# Patient Record
Sex: MALE | HISPANIC OR LATINO | Employment: PART TIME | URBAN - METROPOLITAN AREA
[De-identification: names, ages, dates, MRNs, and addresses within clinical notes are randomized per-mention and may not be internally consistent; named-entity substitution may affect disease eponyms.]

---

## 2022-04-26 ENCOUNTER — OFFICE VISIT (OUTPATIENT)
Dept: SURGERY | Facility: CLINIC | Age: 27
End: 2022-04-26
Attending: INTERNAL MEDICINE

## 2022-04-26 ENCOUNTER — OFFICE VISIT (OUTPATIENT)
Dept: FAMILY MEDICINE | Facility: CLINIC | Age: 27
End: 2022-04-26
Payer: MEDICAID

## 2022-04-26 VITALS
RESPIRATION RATE: 16 BRPM | DIASTOLIC BLOOD PRESSURE: 76 MMHG | HEART RATE: 94 BPM | HEIGHT: 67 IN | BODY MASS INDEX: 21.5 KG/M2 | OXYGEN SATURATION: 100 % | TEMPERATURE: 97.7 F | WEIGHT: 137 LBS | SYSTOLIC BLOOD PRESSURE: 114 MMHG

## 2022-04-26 VITALS — SYSTOLIC BLOOD PRESSURE: 106 MMHG | DIASTOLIC BLOOD PRESSURE: 80 MMHG

## 2022-04-26 DIAGNOSIS — Z79.899 ON PRE-EXPOSURE PROPHYLAXIS FOR HIV: ICD-10-CM

## 2022-04-26 DIAGNOSIS — Z23 NEED FOR VACCINATION: ICD-10-CM

## 2022-04-26 DIAGNOSIS — L05.01 PILONIDAL ABSCESS: Primary | ICD-10-CM

## 2022-04-26 LAB
ALBUMIN SERPL-MCNC: 4.3 G/DL (ref 3.4–5)
ALP SERPL-CCNC: 75 U/L (ref 40–150)
ALT SERPL W P-5'-P-CCNC: 30 U/L (ref 0–70)
ANION GAP SERPL CALCULATED.3IONS-SCNC: 4 MMOL/L (ref 3–14)
AST SERPL W P-5'-P-CCNC: 21 U/L (ref 0–45)
BILIRUB SERPL-MCNC: 0.4 MG/DL (ref 0.2–1.3)
BUN SERPL-MCNC: 9 MG/DL (ref 7–30)
CALCIUM SERPL-MCNC: 9.1 MG/DL (ref 8.5–10.1)
CHLORIDE BLD-SCNC: 105 MMOL/L (ref 94–109)
CO2 SERPL-SCNC: 29 MMOL/L (ref 20–32)
CREAT SERPL-MCNC: 0.76 MG/DL (ref 0.66–1.25)
ERYTHROCYTE [DISTWIDTH] IN BLOOD BY AUTOMATED COUNT: 12.3 % (ref 10–15)
GFR SERPL CREATININE-BSD FRML MDRD: >90 ML/MIN/1.73M2
GLUCOSE BLD-MCNC: 86 MG/DL (ref 70–99)
HCT VFR BLD AUTO: 45.2 % (ref 40–53)
HGB BLD-MCNC: 15.4 G/DL (ref 13.3–17.7)
MCH RBC QN AUTO: 32.8 PG (ref 26.5–33)
MCHC RBC AUTO-ENTMCNC: 34.1 G/DL (ref 31.5–36.5)
MCV RBC AUTO: 96 FL (ref 78–100)
PLATELET # BLD AUTO: 204 10E3/UL (ref 150–450)
POTASSIUM BLD-SCNC: 4.2 MMOL/L (ref 3.4–5.3)
PROT SERPL-MCNC: 8.2 G/DL (ref 6.8–8.8)
RBC # BLD AUTO: 4.7 10E6/UL (ref 4.4–5.9)
SODIUM SERPL-SCNC: 138 MMOL/L (ref 133–144)
WBC # BLD AUTO: 11.5 10E3/UL (ref 4–11)

## 2022-04-26 PROCEDURE — 99203 OFFICE O/P NEW LOW 30 MIN: CPT | Mod: 25 | Performed by: SURGERY

## 2022-04-26 PROCEDURE — 87070 CULTURE OTHR SPECIMN AEROBIC: CPT | Performed by: SURGERY

## 2022-04-26 PROCEDURE — 36415 COLL VENOUS BLD VENIPUNCTURE: CPT | Performed by: INTERNAL MEDICINE

## 2022-04-26 PROCEDURE — 85027 COMPLETE CBC AUTOMATED: CPT | Performed by: INTERNAL MEDICINE

## 2022-04-26 PROCEDURE — 90715 TDAP VACCINE 7 YRS/> IM: CPT | Performed by: INTERNAL MEDICINE

## 2022-04-26 PROCEDURE — 87077 CULTURE AEROBIC IDENTIFY: CPT | Performed by: SURGERY

## 2022-04-26 PROCEDURE — 90471 IMMUNIZATION ADMIN: CPT | Performed by: INTERNAL MEDICINE

## 2022-04-26 PROCEDURE — 99204 OFFICE O/P NEW MOD 45 MIN: CPT | Mod: 25 | Performed by: INTERNAL MEDICINE

## 2022-04-26 PROCEDURE — 80053 COMPREHEN METABOLIC PANEL: CPT | Performed by: INTERNAL MEDICINE

## 2022-04-26 PROCEDURE — 10080 I&D PILONIDAL CYST SIMPLE: CPT | Performed by: SURGERY

## 2022-04-26 PROCEDURE — 87076 CULTURE ANAEROBE IDENT EACH: CPT | Performed by: SURGERY

## 2022-04-26 RX ORDER — EMTRICITABINE AND TENOFOVIR DISOPROXIL FUMARATE 200; 300 MG/1; MG/1
1 TABLET, FILM COATED ORAL
COMMUNITY
Start: 2022-02-08 | End: 2022-06-23

## 2022-04-26 RX ORDER — IBUPROFEN 800 MG/1
800 TABLET, FILM COATED ORAL EVERY 8 HOURS PRN
Qty: 30 TABLET | Refills: 0 | Status: SHIPPED | OUTPATIENT
Start: 2022-04-26 | End: 2022-06-23

## 2022-04-26 RX ORDER — DOCUSATE SODIUM 100 MG/1
100 CAPSULE, LIQUID FILLED ORAL 2 TIMES DAILY
Qty: 30 CAPSULE | Refills: 0 | Status: SHIPPED | OUTPATIENT
Start: 2022-04-26 | End: 2022-06-23

## 2022-04-26 RX ORDER — SULFAMETHOXAZOLE/TRIMETHOPRIM 800-160 MG
1 TABLET ORAL 2 TIMES DAILY
Qty: 20 TABLET | Refills: 0 | Status: SHIPPED | OUTPATIENT
Start: 2022-04-26 | End: 2022-06-23

## 2022-04-26 RX ORDER — HYDROCODONE BITARTRATE AND ACETAMINOPHEN 5; 325 MG/1; MG/1
1 TABLET ORAL EVERY 6 HOURS PRN
Qty: 18 TABLET | Refills: 0 | Status: SHIPPED | OUTPATIENT
Start: 2022-04-26 | End: 2022-04-29

## 2022-04-26 ASSESSMENT — PAIN SCALES - GENERAL: PAINLEVEL: SEVERE PAIN (7)

## 2022-04-26 NOTE — PATIENT INSTRUCTIONS
As discussed on the risks and complications of this condition Pilonidal abscess and possibly need to be done in a theatre by Surgeon if there is a possible communication with Spinal canal. As per our shared decision placing Urgent referral to general surgery - And since you are unable to dedicate today for this procedure defered the Acute diagnostic services offered to you.     Will do baseline labs and emphasize on keeping up your appointment with the Urgent referral to Surgeon placed to avoid complications as antibiotic is not the cure for this.     ============================

## 2022-04-26 NOTE — PROGRESS NOTES
Assessment and Plan  1. Pilonidal abscess  New problem, with recurrences since 11/2021 and this is the second episode since last one month. Per physical exam this appears deeper to deep fascia and the pus pocket not superficial and amenable to I&D at this time. Will give symptomatic treatment. Please see instructions in AVS below for details on the plan as per the shared decision with patient who declined ADS services offered for immediate removal. All the risks and complications discussed. Placed STAT general surgery referral as opted by the patient.   - Adult General Surg Referral; Future  - Comprehensive metabolic panel (BMP + Alb, Alk Phos, ALT, AST, Total. Bili, TP); Future  - CBC with platelets; Future  - sulfamethoxazole-trimethoprim (BACTRIM DS) 800-160 MG tablet; Take 1 tablet by mouth 2 times daily  Dispense: 20 tablet; Refill: 0  - ibuprofen (ADVIL/MOTRIN) 800 MG tablet; Take 1 tablet (800 mg) by mouth every 8 hours as needed for moderate pain  Dispense: 30 tablet; Refill: 0    2. On pre-exposure prophylaxis for HIV  Pt does follow outside facility for Pre exposure prophylaxis.     3. Need for vaccination  - TDAP VACCINE (Adacel, Boostrix)  [[id number]]     Patient Instructions   As discussed on the risks and complications of this condition Pilonidal abscess and possibly need to be done in a theatre by Surgeon if there is a possible communication with Spinal canal. As per our shared decision placing Urgent referral to general surgery - And since you are unable to dedicate today for this procedure defered the Acute diagnostic services offered to you.     Will do baseline labs and emphasize on keeping up your appointment with the Urgent referral to Surgeon placed to avoid complications as antibiotic is not the cure for this.     ============================        Return in about 3 weeks (around 5/17/2022), or if symptoms worsen or fail to improve, for Preventative Visit.    Christelle Banegas MD   HEALTH  [facility] 26 [facility] who presents for the following health issues       History of Present Illness       Reason for visit:  Pilonidal cyst  Symptom onset:  More than a month  Symptoms include:  Giant cyst at the top of my gluteal cleft  Symptom intensity:  Severe  Symptom progression:  Worsening  Had these symptoms before:  Yes  Has tried/received treatment for these symptoms:  No  What makes it worse:  Sitting and moving  What makes it better:  Baths and warm compress    He eats 2-3 servings of fruits and vegetables daily.He consumes 2 sweetened beverage(s) daily.He exercises with enough effort to increase his heart rate 60 or more minutes per day.  He exercises with enough effort to increase his heart rate 3 or less days per week. He is missing 1 dose(s) of medications per week.       Pt is new to  , no records in [facility] or care everywhere. Here for acute visit and going to establish care with  [place] in 20 days where he resides there.        No Known Allergies     History reviewed. No pertinent past medical history.    History reviewed. No pertinent surgical history.    History reviewed. No pertinent family history.    Social History     Tobacco Use     Smoking status: Current Some Day Smoker     Smokeless tobacco: Never Used   Substance Use Topics     Alcohol use: Yes     Comment: occasional        Current Outpatient Medications   Medication     emtricitabine-tenofovir (TRUVADA) 200-300 MG per tablet     ibuprofen (ADVIL/MOTRIN) 800 MG tablet     sulfamethoxazole-trimethoprim (BACTRIM DS) 800-160 MG tablet     No current facility-administered medications for this visit.        Review of Systems   Constitutional, HEENT, cardiovascular, pulmonary, GI, , musculoskeletal, neuro, skin, endocrine and psych systems are negative, except as otherwise noted.      Objective    /76   Pulse 94   Temp 97.7  F (36.5  C) (Temporal)   Resp 16   Ht 1.702 m (5' 7\")   " Wt 62.1 kg (137 lb)   SpO2 100%   BMI 21.46 kg/m    Body mass index is 21.46 kg/m .  Physical Exam   GENERAL: healthy, alert and no distress  NECK: no adenopathy, no asymmetry, masses, or scars and thyroid normal to palpation  RESP: lungs clear to auscultation - no rales, rhonchi or wheezes  CV: regular rate and rhythm, normal S1 S2, no S3 or S4, no murmur, click or rub, no peripheral edema and peripheral pulses strong  ABDOMEN: soft, nontender, no hepatosplenomegaly, no masses and bowel sounds normal  MS: no gross musculoskeletal defects noted, no edema  BACK EXAM : POSITIVE for pilonidal abscess measuring 5-6 cm in diameter in midline of the marisela cleft with is tender to palpate and no pus pocket visible yet. Positive for Mild erythema and swelling

## 2022-04-26 NOTE — PROGRESS NOTES
General Surgery H&P  Jong Zepeda MRN# [id number]   Age/Sex: 26 year old male YOB: 1995     Reason for visit: 1. Pilonidal abscess            Referring physician: Dr. Christelle Banegas                   Assessment and Plan:   Assessment:  1.  Recurrent pilonidal abscess -  s/p I&D 4/26/22    Plan:  - s/p I/D in clinic today (see procedure note)  - pain control with [place]  - Colace  - follow-up in two weeks over the phone  - anticipate further surgery in 6-8 weeks  -Patient is to continue the Bactrim for now.  Once the cultures have finalized, we will readjust the antibiotics as needed.  -Educated the patient on wound care.       Physician Attestation     I, Garry Gan, saw and evaluated Jong Zepeda as part of a shared resident visit.     I personally reviewed the vital signs, medications, labs and imaging.    I personally performed the substantive portion of the medical decision making for this visit - please see the resident documentation for full details.      Key management decisions made by me and carried out under my direction:     - I n D today of pilonidal abscess  - continue abx  - plan for wound recheck via phone.   - awaiting final cultures.     I personally performed the substantive portion of the history for this visit - please see the resident documentation for full details.    Key additional history findings made by me:     Hx of pilonidal abscess that resolve after spontaneous ruptures.  Patient has recurrent pilonidal abscesses.    I personally performed the substantive portion of the physical exam - please see the residents documentation for full details.    General: No acute distress, calm and cooperative, alert and oriented  Cardiac: Regular rate and rhythm  Respiratory: Breath sounds bilaterally, no wheezing  Abdominal: Soft, nontender, nondistended  Musculoskeletal: All extremities no gross deformity.  Moving all extremities and good range of motion.  Skin: No  "skin lesions or masses.  Large pilonidal abscess with fluctuance and mild induration.  Pilonidal abscess measured 4 x 3 cm.  Tender to palpation        Garry Gan  Date of Service (when I saw the patient): 04/26/22       Chief Complaint:     Chief Complaint   Patient presents with     Consult     Recurring issue - red and inflamed but not currently draining        History is obtained from the patient    HPI:   Jong Zepeda is a 26 year old male with no significant past medical history who presents with a pilonidal abscess.     Patient states he developed a painful area above his buttocks in 11/2021 with subsequent drainage on its own. He has had at least two recurrences since then. Today, he states it has \"filled up\" and has not drained yet. It is very painful. He endorses some mild chills in the past few days. He was seen by Dr. Banegas, who placed a STAT general surgery referral for incision and drainage.           Past Medical History:   History reviewed. No pertinent past medical history.           Past Surgical History:   History reviewed. No pertinent surgical history.          Social History:    reports that he has been smoking. He has never used smokeless tobacco. He reports current alcohol use. He reports that he does not use drugs.           Family History:   History reviewed. No pertinent family history.           Allergies:   No Known Allergies           Medications:     Prior to Admission medications    Medication Sig Start Date End Date Taking? Authorizing Provider   docusate sodium (COLACE) 100 MG capsule Take 1 capsule (100 mg) by mouth 2 times daily 4/26/22  Yes Garry Gan, DO   emtricitabine-tenofovir (TRUVADA) 200-300 MG per tablet Take 1 tablet by mouth 2/8/22 5/9/22 Yes Reported, Patient   HYDROcodone-acetaminophen (NORCO) 5-325 MG tablet Take 1 tablet by mouth every 6 hours as needed for pain 4/26/22 4/29/22 Yes Garry Gan, DO   ibuprofen (ADVIL/MOTRIN) 800 MG tablet Take 1 tablet " (800 mg) by mouth every 8 hours as needed for moderate pain 4/26/22  Yes Christelle Banegas MD   sulfamethoxazole-trimethoprim (BACTRIM DS) 800-160 MG tablet Take 1 tablet by mouth 2 times daily 4/26/22  Yes Christelle Banegas MD              Review of Systems:   A 12 point Review of Systems is negative other than noted in the HPI            Physical Exam:     Patient Vitals for the past 24 hrs:   BP   04/26/22 1359 106/80        [unfilled]   Constitutional:   awake, alert, cooperative, no apparent distress, and appears stated age       Eyes:   PERRL, conjunctiva/corneas clear, EOM's intact; no scleral edema or icterus noted        ENT:   Normocephalic, without obvious abnormality, atraumatic, Lips, mucosa, and tongue normal        Hematologic / Lymphatic:   No lymphadenopathy       Lungs:   Normal respiratory effort, no accessory muscle use, breath sounds bilaterally on auscultation       Cardiovascular:   Regular rate and rhythm       Abdomen:   Soft, nondistended, nontender to palpation       Musculoskeletal:   No obvious swelling, bruising or deformity       Skin:   Large, 4 x 3 cm pilonidal cyst with fluctuance             Data:            Results for orders placed or performed in visit on 04/26/22 (from the past 24 hour(s))   Comprehensive metabolic panel (BMP + Alb, Alk Phos, ALT, AST, Total. Bili, TP)   Result Value Ref Range    Sodium 138 133 - 144 mmol/L    Potassium 4.2 3.4 - 5.3 mmol/L    Chloride 105 94 - 109 mmol/L    Carbon Dioxide (CO2)      Anion Gap      Urea Nitrogen      Creatinine      Calcium      Glucose      Alkaline Phosphatase      AST      ALT      Protein Total      Albumin      Bilirubin Total      GFR Estimate     CBC with platelets   Result Value Ref Range    WBC Count 11.5 (H) 4.0 - 11.0 10e3/uL    RBC Count 4.70 4.40 - 5.90 10e6/uL    Hemoglobin 15.4 13.3 - 17.7 g/dL    Hematocrit 45.2 40.0 - 53.0 %    MCV 96 78 - 100 fL    MCH 32.8 26.5 - 33.0 pg    MCHC 34.1 31.5 - 36.5  g/dL    RDW 12.3 10.0 - 15.0 %    Platelet Count 204 150 - 450 10e3/uL        MD Garry Bautista,   General Surgeon  [facility]  Surgery [address] [address]?  Office: [phone]  Employed by - [facility]  Pager: [phone]           Jong CHENG, give verbal consent for a resident to be present in today's visit.

## 2022-04-26 NOTE — PROCEDURES
[facility]    Procedure: Incision and drainage of infected pilonidal abscess    Surgeon: Surgeon(s) and Role:     * Garry Gan DO - Primary  Elizabeth Dang MD - resident   Anesthesia: General   Estimated Blood Loss: 1 cc    Drains: None  Specimens: * No specimens in log *  Findings:   Infected pilonidal abscess.  Complications: None.  Implants: * No implants in log *    Indication: 26-year-old male presents with a pilonidal cyst that was infected.  Patient has recurrence of the pilonidal abscess.  His first bilateral abscess was back in November 2021 which it opened and drained.  The pilonidal abscess recurred.  After evaluation of the patient, I offered the patient a incision and drainage of the infected pilonidal cyst.  The risks and benefits of the procedure were explained detail to the patient. The risks include infection, bleeding, damage to the surrounding structures. Patient verbalized understanding provided consent to undergo the procedure above.      Procedure: Patient was kept on the clinic table in a right-sided lateral decubitus position.  Prior to initial procedure, timeout was completed.  All present were in agreement.  The area was prepped and draped in usual sterile fashion.  1% lidocaine with epinephrine was used to inject around the area of most fluctuance.    A 15 blade was then used to make a cruciate skin incision approximately 2 cm in length.  Immediately purulent material was expressed.  Aerobic cultures were then obtained and sent off for further analysis.  The abscess pocket was then opened up using a combination of blunt dissection as well as sharp dissection using the scissors.  The area was then irrigated with copious amounts of a solution mixture of 50% Betadine and sterile saline.  This area was then cleaned and dried.  Sterile 4 x 4 was then used as packing.  Sterile dressings were then to cover the area.  The patient Toller procedure well with no complications.  At  the end of the procedure, a final count was completed.  All sharps, sponges, instruments were accounted for.    Garry Gan DO  General Surgeon  [facility]  Surgery [facility] - [address] [address]?  Office: [phone]  Employed by - [facility]  Pager: [phone]

## 2022-04-26 NOTE — LETTER
4/26/2022         RE: Jong Zepeda  [address]  [facility] [address]        Dear Colleague,    Thank you for referring your patient, Jong Zepeda, to the [facility] BARIATR[facility]. Please see a copy of my visit note below.      General Surgery H&P  Jong Zepeda MRN# [id number]   Age/Sex: 26 year old male YOB: 1995     Reason for visit: 1. Pilonidal abscess            Referring physician: Dr. Christelle Banegas                   Assessment and Plan:   Assessment:  1.  Recurrent pilonidal abscess -  s/p I&D 4/26/22    Plan:  - s/p I/D in clinic today (see procedure note)  - pain control with [place]  - Colace  - follow-up in two weeks over the phone  - anticipate further surgery in 6-8 weeks  -Patient is to continue the Bactrim for now.  Once the cultures have finalized, we will readjust the antibiotics as needed.  -Educated the patient on wound care.       Physician Attestation     IGarry, saw and evaluated Jong Zepeda as part of a shared resident visit.     I personally reviewed the vital signs, medications, labs and imaging.    I personally performed the substantive portion of the medical decision making for this visit - please see the resident documentation for full details.      Key management decisions made by me and carried out under my direction:     - I n D today of pilonidal abscess  - continue abx  - plan for wound recheck via phone.   - awaiting final cultures.     I personally performed the substantive portion of the history for this visit - please see the resident documentation for full details.    Key additional history findings made by me:     Hx of pilonidal abscess that resolve after spontaneous ruptures.  Patient has recurrent pilonidal abscesses.    I personally performed the substantive portion of the physical exam - please see the residents documentation for full details.    General: No acute  "distress, calm and cooperative, alert and oriented  Cardiac: Regular rate and rhythm  Respiratory: Breath sounds bilaterally, no wheezing  Abdominal: Soft, nontender, nondistended  Musculoskeletal: All extremities no gross deformity.  Moving all extremities and good range of motion.  Skin: No skin lesions or masses.  Large pilonidal abscess with fluctuance and mild induration.  Pilonidal abscess measured 4 x 3 cm.  Tender to palpation        Garry Gan  Date of Service (when I saw the patient): 04/26/22       Chief Complaint:     Chief Complaint   Patient presents with     Consult     Recurring issue - red and inflamed but not currently draining        History is obtained from the patient    HPI:   Jong Zepeda is a 26 year old male with no significant past medical history who presents with a pilonidal abscess.     Patient states he developed a painful area above his buttocks in 11/2021 with subsequent drainage on its own. He has had at least two recurrences since then. Today, he states it has \"filled up\" and has not drained yet. It is very painful. He endorses some mild chills in the past few days. He was seen by Dr. Banegas, who placed a STAT general surgery referral for incision and drainage.           Past Medical History:   History reviewed. No pertinent past medical history.           Past Surgical History:   History reviewed. No pertinent surgical history.          Social History:    reports that he has been smoking. He has never used smokeless tobacco. He reports current alcohol use. He reports that he does not use drugs.           Family History:   History reviewed. No pertinent family history.           Allergies:   No Known Allergies           Medications:     Prior to Admission medications    Medication Sig Start Date End Date Taking? Authorizing Provider   docusate sodium (COLACE) 100 MG capsule Take 1 capsule (100 mg) by mouth 2 times daily 4/26/22  Yes Garry Gna, DO "   emtricitabine-tenofovir (TRUVADA) 200-300 MG per tablet Take 1 tablet by mouth 2/8/22 5/9/22 Yes Reported, Patient   HYDROcodone-acetaminophen (NORCO) 5-325 MG tablet Take 1 tablet by mouth every 6 hours as needed for pain 4/26/22 4/29/22 Yes Garry Gan,    ibuprofen (ADVIL/MOTRIN) 800 MG tablet Take 1 tablet (800 mg) by mouth every 8 hours as needed for moderate pain 4/26/22  Yes Christelle Banegas MD   sulfamethoxazole-trimethoprim (BACTRIM DS) 800-160 MG tablet Take 1 tablet by mouth 2 times daily 4/26/22  Yes Christelle Banegas MD              Review of Systems:   A 12 point Review of Systems is negative other than noted in the HPI            Physical Exam:     Patient Vitals for the past 24 hrs:   BP   04/26/22 1359 106/80        [unfilled]   Constitutional:   awake, alert, cooperative, no apparent distress, and appears stated age       Eyes:   PERRL, conjunctiva/corneas clear, EOM's intact; no scleral edema or icterus noted        ENT:   Normocephalic, without obvious abnormality, atraumatic, Lips, mucosa, and tongue normal        Hematologic / Lymphatic:   No lymphadenopathy       Lungs:   Normal respiratory effort, no accessory muscle use, breath sounds bilaterally on auscultation       Cardiovascular:   Regular rate and rhythm       Abdomen:   Soft, nondistended, nontender to palpation       Musculoskeletal:   No obvious swelling, bruising or deformity       Skin:   Large, 4 x 3 cm pilonidal cyst with fluctuance             Data:            Results for orders placed or performed in visit on 04/26/22 (from the past 24 hour(s))   Comprehensive metabolic panel (BMP + Alb, Alk Phos, ALT, AST, Total. Bili, TP)   Result Value Ref Range    Sodium 138 133 - 144 mmol/L    Potassium 4.2 3.4 - 5.3 mmol/L    Chloride 105 94 - 109 mmol/L    Carbon Dioxide (CO2)      Anion Gap      Urea Nitrogen      Creatinine      Calcium      Glucose      Alkaline Phosphatase      AST      ALT      Protein Total       Albumin      Bilirubin Total      GFR Estimate     CBC with platelets   Result Value Ref Range    WBC Count 11.5 (H) 4.0 - 11.0 10e3/uL    RBC Count 4.70 4.40 - 5.90 10e6/uL    Hemoglobin 15.4 13.3 - 17.7 g/dL    Hematocrit 45.2 40.0 - 53.0 %    MCV 96 78 - 100 fL    MCH 32.8 26.5 - 33.0 pg    MCHC 34.1 31.5 - 36.5 g/dL    RDW 12.3 10.0 - 15.0 %    Platelet Count 204 150 - 450 10e3/uL        MD Garry Bautista DO  General Surgeon  [facility]  Surgery [address] [address]?  Office: [phone]  Employed by - [facility]  Pager: [phone]           Jong CHENG, give verbal consent for a resident to be present in today's visit.      Again, thank you for allowing me to participate in the care of your patient.        Sincerely,        Garry Gan DO

## 2022-04-29 LAB
BACTERIA ABSC ANAEROBE+AEROBE CULT: ABNORMAL

## 2022-05-02 ENCOUNTER — TELEPHONE (OUTPATIENT)
Dept: SURGERY | Facility: CLINIC | Age: 27
End: 2022-05-02
Payer: MEDICAID

## 2022-05-02 NOTE — TELEPHONE ENCOUNTER
----- Message from Garry Gan DO sent at 5/2/2022 11:08 AM CDT -----  Is at the patient know that the wound cultures did grow bacteria.  The patient is currently on Bactrim for antibiotics which does cover the bacteria.  Ask if the patient has any acute changes in the symptoms or if there is any improvement.  Thank you.    LV

## 2022-05-02 NOTE — TELEPHONE ENCOUNTER
Left message for patient to call back.       PHILLIP [facility] Hospitalgrayson Gross RN  [facility]  General Surgery  [address] [place] [address]  [email or web address]   Office:[phone]  Employed by [facility],

## 2022-05-02 NOTE — TELEPHONE ENCOUNTER
Spoke to [place]. He is taking Batrim and his wound is healing up well. No changes. I told him to let us know if he develops any symptoms. He expressed understanding.       [facility] Hospital[facility]ayson Gross RN  [facility]  General Surgery  [facility]  [address] [place] [address]  [email or web address]   Office:[phone]  Employed by [facility],

## 2022-05-17 ENCOUNTER — VIRTUAL VISIT (OUTPATIENT)
Dept: SURGERY | Facility: CLINIC | Age: 27
End: 2022-05-17
Payer: MEDICAID

## 2022-05-17 DIAGNOSIS — Z48.89 POSTOPERATIVE VISIT: Primary | ICD-10-CM

## 2022-05-17 PROCEDURE — 99024 POSTOP FOLLOW-UP VISIT: CPT | Performed by: SURGERY

## 2022-05-17 NOTE — PROGRESS NOTES
"  The patient has been notified of following:     \"This telephone visit will be conducted via a call between you and your physician/provider. We have found that certain health care needs can be provided without the need for a physical exam.  This service lets us provide the care you need with a short phone conversation.  If a prescription is necessary we can send it directly to your pharmacy.  If lab work is needed we can place an order for that and you can then stop by our lab to have the test done at a later time.    Telephone visits are billed at different rates depending on your insurance coverage. During this emergency period, for some insurers they may be billed the same as an in-person visit.  Please reach out to your insurance provider with any questions.    If during the course of the call the physician/provider feels a telephone visit is not appropriate, you will not be charged for this service.\"    Patient has given verbal consent to a Telephone visit? Yes    What phone number would you like to be contacted at? [phone]    Patient would like to receive their AVS by [facility]    Are there any specific questions or needs that you would like addressed at your visit today? Nothing specific    Additional provider notes:     Talk to the patient over the phone.  Patient is doing well.  He has no drainage from the site.  The site has healed up well.  Patient has completed his antibiotics course.  He has no further complaints no fevers no chills.    Plan  -Patient can follow-up with me if there is any acute changes to his signs and symptoms.  Otherwise no further scheduled follow-up needed.    Phone call duration: 5 minutes      "

## 2022-05-29 ENCOUNTER — HEALTH MAINTENANCE LETTER (OUTPATIENT)
Age: 27
End: 2022-05-29

## 2022-06-21 NOTE — PROGRESS NOTES
SUBJECTIVE:   CC: Jong Zepeda is an 26 year old male who presents for preventative health visit.   Patient has been advised of split billing requirements and indicates understanding: Yes  Healthy Habits:     Getting at least 3 servings of Calcium per day:  NO    Bi-annual eye exam:  NO    Dental care twice a year:  NO    Sleep apnea or symptoms of sleep apnea:  None    Diet:  Regular (no restrictions)    Frequency of exercise:  2-3 days/week    Duration of exercise:  Greater than 60 minutes    Taking medications regularly:  Yes    Medication side effects:  None    PHQ-2 Total Score: 2    Additional concerns today:  No    Discuss about reoccurring cyst near tailbone.   Already has a procedure done (73597) with remove it but thinks that It may come back again        Today's PHQ-2 Score:   PHQ-2 ( 1999 [employer]) 6/22/2022   Q1: Little interest or pleasure in doing things 1   Q2: Feeling down, depressed or hopeless 1   PHQ-2 Score 2   Q1: Little interest or pleasure in doing things Several days   Q2: Feeling down, depressed or hopeless Several days   PHQ-2 Score 2       Abuse: Current or Past(Physical, Sexual or Emotional)- No  Do you feel safe in your environment? Yes    Have you ever done Advance Care Planning? (For example, a Health Directive, POLST, or a discussion with a medical provider or your loved ones about your wishes): No, advance care planning information given to patient to review.  Patient declined advance care planning discussion at this time.    Social History     Tobacco Use     Smoking status: Passive Smoke Exposure - Never Smoker     Smokeless tobacco: Never Used     Tobacco comment: I smoke tied to stress levels   Substance Use Topics     Alcohol use: Yes     Comment: occasional     If you drink alcohol do you typically have >3 drinks per day or >7 drinks per week? No    No flowsheet data found.    Last PSA: No results found for: PSA    Reviewed orders with patient. Reviewed health  maintenance and updated orders accordingly - Yes  Labs reviewed in EPIC  BP Readings from Last 3 Encounters:   06/23/22 98/61   04/26/22 106/80   04/26/22 114/76    Wt Readings from Last 3 Encounters:   06/23/22 62.1 kg (136 lb 14.4 oz)   04/26/22 62.1 kg (137 lb)                  Patient Active Problem List   Diagnosis     Pilonidal abscess     On pre-exposure prophylaxis for HIV     History reviewed. No pertinent surgical history.    Social History     Tobacco Use     Smoking status: Passive Smoke Exposure - Never Smoker     Smokeless tobacco: Never Used     Tobacco comment: I smoke tied to stress levels   Substance Use Topics     Alcohol use: Yes     Comment: occasional     Family History   Problem Relation Age of Onset     Depression Mother      Anxiety Disorder Mother      Asthma Mother      Depression Father      Anxiety Disorder Father      Mental Illness Father      Substance Abuse Father      Diabetes No family hx of      Obesity No family hx of          Current Outpatient Medications   Medication Sig Dispense Refill     cephALEXin (KEFLEX) 500 MG capsule Take 1 capsule (500 mg) by mouth 2 times daily for 10 days 20 capsule 1     Cetirizine HCl (ZYRTEC ALLERGY PO)        emtricitabine-tenofovir (TRUVADA) 200-300 MG per tablet Take 1 tablet by mouth       Allergies   Allergen Reactions     Seasonal Allergies Cough, Itching and Swelling     Recent Labs   Lab Test 04/26/22  0736   ALT 30   CR 0.76   GFRESTIMATED >90   POTASSIUM 4.2        Reviewed and updated as needed this visit by clinical staff   Tobacco  Allergies  Meds  Problems  Med Hx  Surg Hx  Fam Hx  Soc   Hx          Reviewed and updated as needed this visit by Provider   Tobacco  Allergies  Meds  Problems  Med Hx  Surg Hx  Fam Hx             Past Medical History:   Diagnosis Date     Depressive disorder Childhood      History reviewed. No pertinent surgical history.    Review of Systems   Constitutional: Negative for chills and fever.  "  HENT: Negative for congestion, ear pain, hearing loss and sore throat.    Eyes: Negative for pain and visual disturbance.   Respiratory: Negative for cough and shortness of breath.    Cardiovascular: Negative for chest pain, palpitations and peripheral edema.   Gastrointestinal: Negative for abdominal pain, constipation, diarrhea, heartburn, hematochezia and nausea.   Genitourinary: Negative for dysuria, frequency, genital sores, hematuria, impotence, penile discharge and urgency.   Musculoskeletal: Negative for arthralgias, joint swelling and myalgias.   Skin: Negative for rash.   Neurological: Negative for dizziness, weakness, headaches and paresthesias.   Psychiatric/Behavioral: Positive for mood changes. The patient is nervous/anxious.        OBJECTIVE:   BP 98/61   Pulse 83   Temp 97.8  F (36.6  C) (Temporal)   Resp 16   Ht 1.691 m (5' 6.58\")   Wt 62.1 kg (136 lb 14.4 oz)   SpO2 97%   BMI 21.72 kg/m      Physical Exam  GENERAL: healthy, alert and no distress  EYES: Eyes grossly normal to inspection, PERRL and conjunctivae and sclerae normal  HENT: ear canals and TM's normal, nose and mouth without ulcers or lesions  NECK: no adenopathy, no asymmetry, masses, or scars and thyroid normal to palpation  RESP: lungs clear to auscultation - no rales, rhonchi or wheezes  CV: regular rate and rhythm, normal S1 S2, no S3 or S4, no murmur, click or rub, no peripheral edema and peripheral pulses strong  ABDOMEN: soft, nontender, no hepatosplenomegaly, no masses and bowel sounds normal  MS: no gross musculoskeletal defects noted, no edema  SKIN: no suspicious lesions or rashes  NEURO: Normal strength and tone, mentation intact and speech normal  PSYCH: mentation appears normal, affect normal/bright    Diagnostic Test Results:  Labs reviewed in Epic    ASSESSMENT/PLAN:       ICD-10-CM    1. Routine general medical examination at a health care facility  Z00.00    2. Pilonidal abscess  L05.01 cephALEXin (KEFLEX) 500 " "MG capsule       Patient has been advised of split billing requirements and indicates understanding: Yes    COUNSELING:   Reviewed preventive health counseling, as reflected in patient instructions       Regular exercise       Healthy diet/nutrition    Estimated body mass index is 21.72 kg/m  as calculated from the following:    Height as of this encounter: 1.691 m (5' 6.58\").    Weight as of this encounter: 62.1 kg (136 lb 14.4 oz).         He reports that he is a non-smoker but has been exposed to tobacco smoke. The exposure started about 27 years ago. He has been exposed to 0.50 packs per day for the past 9.00 years. He has never used smokeless tobacco.  Tobacco Cessation Action Plan:   Information offered: Patient not interested at this time      Counseling Resources:  ATP IV Guidelines  Pooled Cohorts Equation Calculator  FRAX Risk Assessment  ICSI Preventive Guidelines  Dietary Guidelines for Americans, 2010  USDA's MyPlate  ASA Prophylaxis  Lung CA Screening    SHIRLENE Lopez [facility]  "

## 2022-06-22 ASSESSMENT — ENCOUNTER SYMPTOMS
DYSURIA: 0
DIARRHEA: 0
NERVOUS/ANXIOUS: 1
ABDOMINAL PAIN: 0
COUGH: 0
HEMATOCHEZIA: 0
WEAKNESS: 0
DIZZINESS: 0
HEMATURIA: 0
NAUSEA: 0
HEADACHES: 0
CHILLS: 0
SHORTNESS OF BREATH: 0
SORE THROAT: 0
MYALGIAS: 0
JOINT SWELLING: 0
FEVER: 0
HEARTBURN: 0
FREQUENCY: 0
PALPITATIONS: 0
CONSTIPATION: 0
EYE PAIN: 0
ARTHRALGIAS: 0
PARESTHESIAS: 0

## 2022-06-23 ENCOUNTER — OFFICE VISIT (OUTPATIENT)
Dept: FAMILY MEDICINE | Facility: CLINIC | Age: 27
End: 2022-06-23
Payer: COMMERCIAL

## 2022-06-23 VITALS
HEIGHT: 67 IN | OXYGEN SATURATION: 97 % | BODY MASS INDEX: 21.49 KG/M2 | TEMPERATURE: 97.8 F | SYSTOLIC BLOOD PRESSURE: 98 MMHG | RESPIRATION RATE: 16 BRPM | DIASTOLIC BLOOD PRESSURE: 61 MMHG | WEIGHT: 136.9 LBS | HEART RATE: 83 BPM

## 2022-06-23 DIAGNOSIS — L05.01 PILONIDAL ABSCESS: ICD-10-CM

## 2022-06-23 DIAGNOSIS — Z00.00 ROUTINE GENERAL MEDICAL EXAMINATION AT A HEALTH CARE FACILITY: Primary | ICD-10-CM

## 2022-06-23 PROCEDURE — 99395 PREV VISIT EST AGE 18-39: CPT | Performed by: PHYSICIAN ASSISTANT

## 2022-06-23 RX ORDER — CEPHALEXIN 500 MG/1
500 CAPSULE ORAL 2 TIMES DAILY
Qty: 20 CAPSULE | Refills: 1 | Status: SHIPPED | OUTPATIENT
Start: 2022-06-23 | End: 2022-07-03

## 2022-06-23 ASSESSMENT — ENCOUNTER SYMPTOMS
HEMATOCHEZIA: 0
DYSURIA: 0
SORE THROAT: 0
FREQUENCY: 0
CHILLS: 0
DIARRHEA: 0
ARTHRALGIAS: 0
NAUSEA: 0
HEMATURIA: 0
CONSTIPATION: 0
ABDOMINAL PAIN: 0
PARESTHESIAS: 0
FEVER: 0
NERVOUS/ANXIOUS: 1
HEARTBURN: 0
MYALGIAS: 0
EYE PAIN: 0
WEAKNESS: 0
COUGH: 0
JOINT SWELLING: 0
SHORTNESS OF BREATH: 0
PALPITATIONS: 0
HEADACHES: 0
DIZZINESS: 0

## 2022-10-03 ENCOUNTER — HEALTH MAINTENANCE LETTER (OUTPATIENT)
Age: 27
End: 2022-10-03

## 2022-12-08 ENCOUNTER — OFFICE VISIT (OUTPATIENT)
Dept: FAMILY MEDICINE | Facility: CLINIC | Age: 27
End: 2022-12-08
Payer: COMMERCIAL

## 2022-12-08 VITALS
BODY MASS INDEX: 21.19 KG/M2 | WEIGHT: 135 LBS | RESPIRATION RATE: 16 BRPM | TEMPERATURE: 97.1 F | SYSTOLIC BLOOD PRESSURE: 121 MMHG | HEIGHT: 67 IN | OXYGEN SATURATION: 99 % | HEART RATE: 70 BPM | DIASTOLIC BLOOD PRESSURE: 77 MMHG

## 2022-12-08 DIAGNOSIS — K64.4 EXTERNAL HEMORRHOIDS: Primary | ICD-10-CM

## 2022-12-08 DIAGNOSIS — Z23 NEED FOR IMMUNIZATION AGAINST INFLUENZA: ICD-10-CM

## 2022-12-08 DIAGNOSIS — Z23 HIGH PRIORITY FOR 2019-NCOV VACCINE: ICD-10-CM

## 2022-12-08 PROCEDURE — 99213 OFFICE O/P EST LOW 20 MIN: CPT | Mod: 25 | Performed by: PHYSICIAN ASSISTANT

## 2022-12-08 PROCEDURE — 0124A COVID-19 VACCINE BIVALENT BOOSTER 12+ (PFIZER): CPT | Performed by: PHYSICIAN ASSISTANT

## 2022-12-08 PROCEDURE — 90686 IIV4 VACC NO PRSV 0.5 ML IM: CPT | Performed by: PHYSICIAN ASSISTANT

## 2022-12-08 PROCEDURE — 90471 IMMUNIZATION ADMIN: CPT | Performed by: PHYSICIAN ASSISTANT

## 2022-12-08 PROCEDURE — 91312 COVID-19 VACCINE BIVALENT BOOSTER 12+ (PFIZER): CPT | Performed by: PHYSICIAN ASSISTANT

## 2022-12-08 ASSESSMENT — PAIN SCALES - GENERAL: PAINLEVEL: NO PAIN (0)

## 2022-12-08 NOTE — PROGRESS NOTES
Assessment & Plan     External hemorrhoids  Long standing, chronic, controlled overall but ready for next steps - referral for colorectal placed and options discussed with patient at length. Over the counter and supportive care discussed with high fiber and fluids in diet encouraged. Return to clinic with any worsening or changes in symptoms and follow up for routine care.   - Adult Colorectal Surgery  Referral; Future  - Adult Colorectal Surgery  Referral; Future    Need for immunization against influenza  - INFLUENZA VACCINE IM > 6 MONTHS VALENT IIV4 (AFLURIA/FLUZONE)    High priority for 2019-nCoV vaccine  - COVID-19,PF,PFIZER BOOSTER BIVALENT 12+Yrs    Review of prior external note(s) from - previous routine notes  15 minutes spent on the date of the encounter doing chart review, history and exam, documentation and further activities per the note       There are no Patient Instructions on file for this visit.    Return in about 6 months (around 6/8/2023) for Follow up, Routine Visit, or sooner with worsening symptoms.    Milli Ortega PA-C  [facility] is a 26 year old presenting for the following health issues:  Rectal Problem and Imm/Inj (COVID-19 VACCINE)      History of Present Illness       Reason for visit:  Reoccuring hemmoroids  Symptom onset:  More than a month  Symptoms include:  Stomach pain and occasional bleeding  Symptom intensity:  Mild  Symptom progression:  Staying the same  Had these symptoms before:  Yes  Has tried/received treatment for these symptoms:  No  What makes it worse:  Diet impacts  What makes it better:  No    He eats 0-1 servings of fruits and vegetables daily.He consumes 1 sweetened beverage(s) daily.He exercises with enough effort to increase his heart rate 30 to 60 minutes per day.  He exercises with enough effort to increase his heart rate 4 days per week.   He is taking medications regularly.    "      Review of Systems   Constitutional, HEENT, cardiovascular, pulmonary, GI, , musculoskeletal, neuro, skin, endocrine and psych systems are negative, except as otherwise noted.      Objective    /77   Pulse 70   Temp 97.1  F (36.2  C) (Temporal)   Resp 16   Ht 1.695 m (5' 6.75\")   Wt 61.2 kg (135 lb)   SpO2 99%   BMI 21.30 kg/m    Body mass index is 21.3 kg/m .  Physical Exam   GENERAL: healthy, alert and no distress  NECK: no adenopathy, no asymmetry, masses, or scars and thyroid normal to palpation  RESP: lungs clear to auscultation - no rales, rhonchi or wheezes  CV: regular rate and rhythm, normal S1 S2, no S3 or S4, no murmur, click or rub, no peripheral edema and peripheral pulses strong  ABDOMEN: soft, nontender, no hepatosplenomegaly, no masses and bowel sounds normal  RECTAL (male): normal sphincter tone, no rectal masses, prostate normal size, smooth, nontender without nodules or masses  MS: no gross musculoskeletal defects noted, no edema                "

## 2022-12-13 NOTE — TELEPHONE ENCOUNTER
Diagnosis, Referred by & from: External Hemorrhoids; referred by Milli Ortega   Appt date: 1/17/2023   NOTES STATUS DETAILS   OFFICE NOTE from referring provider Internal [facility] - [place]:  12/8/22, 6/23/22 - [facility] OV with JOEL Buitrago   OFFICE NOTE from other specialist [facility] - [place]:  5/17/22, 4/26/22 - [facility] OV with Dr. Malik Ruiz - [place]:  4/26/22 - [facility] OV with Dr. Banegas   DISCHARGE SUMMARY from hospital N/A    DISCHARGE REPORT from the ER N/A    OPERATIVE REPORT N/A    MEDICATION LIST Internal    LABS N/A    DIAGNOSTIC PROCEDURES N/A    IMAGING (DISC & REPORT) N/A

## 2023-01-10 ENCOUNTER — MYC MEDICAL ADVICE (OUTPATIENT)
Dept: SURGERY | Facility: CLINIC | Age: 28
End: 2023-01-10

## 2023-01-17 ENCOUNTER — OFFICE VISIT (OUTPATIENT)
Dept: SURGERY | Facility: CLINIC | Age: 28
End: 2023-01-17
Attending: PHYSICIAN ASSISTANT
Payer: COMMERCIAL

## 2023-01-17 ENCOUNTER — PRE VISIT (OUTPATIENT)
Dept: SURGERY | Facility: CLINIC | Age: 28
End: 2023-01-17

## 2023-01-17 VITALS
OXYGEN SATURATION: 100 % | SYSTOLIC BLOOD PRESSURE: 112 MMHG | HEART RATE: 82 BPM | HEIGHT: 67 IN | DIASTOLIC BLOOD PRESSURE: 74 MMHG | WEIGHT: 139.8 LBS | BODY MASS INDEX: 21.94 KG/M2

## 2023-01-17 DIAGNOSIS — K64.4 EXTERNAL HEMORRHOIDS: ICD-10-CM

## 2023-01-17 DIAGNOSIS — K62.5 RECTAL BLEEDING: Primary | ICD-10-CM

## 2023-01-17 DIAGNOSIS — R19.7 DIARRHEA, UNSPECIFIED TYPE: ICD-10-CM

## 2023-01-17 DIAGNOSIS — R10.84 ABDOMINAL PAIN, GENERALIZED: ICD-10-CM

## 2023-01-17 DIAGNOSIS — R10.13 ABDOMINAL PAIN, EPIGASTRIC: ICD-10-CM

## 2023-01-17 DIAGNOSIS — K59.09 OTHER CONSTIPATION: ICD-10-CM

## 2023-01-17 PROCEDURE — 99213 OFFICE O/P EST LOW 20 MIN: CPT | Performed by: NURSE PRACTITIONER

## 2023-01-17 ASSESSMENT — ENCOUNTER SYMPTOMS
BLOOD IN STOOL: 1
DEPRESSION: 1
NERVOUS/ANXIOUS: 1
ABDOMINAL PAIN: 1
INSOMNIA: 1
NECK PAIN: 1
CONSTIPATION: 1
STIFFNESS: 1
MUSCLE CRAMPS: 1
ARTHRALGIAS: 1
BLOATING: 1
DIFFICULTY URINATING: 1
RECTAL PAIN: 1

## 2023-01-17 ASSESSMENT — PAIN SCALES - GENERAL: PAINLEVEL: NO PAIN (0)

## 2023-01-17 NOTE — NURSING NOTE
"Chief Complaint   Patient presents with     New Patient     Hemorrhoids       Vitals:    01/17/23 1436   BP: 112/74   BP Location: Left arm   Patient Position: Sitting   Cuff Size: Adult Regular   Pulse: 82   SpO2: 100%   Weight: 139 lb 12.8 oz   Height: 5' 7\"       Body mass index is 21.9 kg/m .     Tino Huertas, EMT- P    "

## 2023-01-17 NOTE — PROGRESS NOTES
"Colon and Rectal Surgery Consult Clinic Note    Date: 2023     Referring provider:  Milli Ortega PA-C  [address] [facility] [address]  [place] [address]     RE: Jong Zepeda  : 1995  ANNALISE: 2023    Jong Zepeda is a very pleasant 27 year old male here for hemorrhoids.    HPI:  Has had recurring hemorrhodis for the past 8 years. Gets spotting, some pain, bloating, and abdominal cramping. Some sharp abdominal pain. With external hemorrhoids no sharp pain. Hemorrhoid symptoms have imporved with bidet use. Has some rectal bleeding every few weeks with wiping only. Has not had any bleeding for about two weeks. He has been working on not straining as much with bowel movements and feels this has helped. Some occasional prolapsing tissue. No anorectal surgeries. Does not smoke. Does have anal intercourse. No family history of colon cancer. Has never had a colonoscopy. Bowel movements are very inconsistent.     Physical Examination: Exam was chaperoned by Tino Huertas, EMT-P   /74 (BP Location: Left arm, Patient Position: Sitting, Cuff Size: Adult Regular)   Pulse 82   Ht 5' 7\"   Wt 139 lb 12.8 oz   SpO2 100%   BMI 21.90 kg/m    General: alert, oriented, in no acute distress, sitting comfortably  HEENT: mucous membranes moist  Perianal external examination:  Perianal skin: Intact with no excoriation or lichenification.  Lesions: No evidence of an external lesion, nodularity, or induration in the perianal region.  Eversion of buttocks: There was not evidence of an anal fissure. Details: N/A.  Skin tags or external hemorrhoids: None.  Digital rectal examination: Was performed.   Sphincter tone: Good.  Palpable lesions: No.  Prostate: Normal.  Other: None.    Anoscopy: Was performed.   Hemorrhoids: Yes. Grade 2 internal hemorrhoids without active bleeding  Lesions: No    Assessment/Plan: 27 year old male with rectal bleeding and abdominal pain. Small internal hemorrhoids " which certainly could be the source of bleeding especially in the setting of intermittent constipation and diarrhea. Recommended a daily fiber supplement. Hemorrhoid symptoms have not been as bad lately so would like to try this first and if they become more symptomatic could consider hemorrhoid banding. Given longstanding bleeding and abdominal pain, recommended a colonoscopy also, which he is agreeable to.    Medical history:  Past Medical History:   Diagnosis Date     Depressive disorder Childhood       Surgical history:  No past surgical history on file.    Problem list:    Patient Active Problem List    Diagnosis Date Noted     Pilonidal abscess 04/26/2022     Priority: Medium     On pre-exposure prophylaxis for HIV 04/26/2022     Priority: Medium       Medications:  Current Outpatient Medications   Medication Sig Dispense Refill     emtricitabine-tenofovir (TRUVADA) 200-300 MG per tablet Take 1 tablet by mouth         Allergies:  Allergies   Allergen Reactions     Seasonal Allergies Cough, Itching and Swelling       Family history:  Family History   Problem Relation Age of Onset     Depression Mother      Anxiety Disorder Mother      Asthma Mother      Depression Father      Anxiety Disorder Father      Mental Illness Father      Substance Abuse Father      Depression Brother      Anxiety Disorder Brother      Mental Illness Brother      Diabetes No family hx of      Obesity No family hx of        Social history:  Social History     Tobacco Use     Smoking status: Never     Passive exposure: Yes     Smokeless tobacco: Never     Tobacco comments:     I smoke tied to stress levels   Substance Use Topics     Alcohol use: Yes     Comment: occasional    Marital status: single.    Nursing Notes:   Tino Huertas, EMT  1/17/2023  2:39 PM  Signed  Chief Complaint   Patient presents with     New Patient     Hemorrhoids       Vitals:    01/17/23 1436   BP: 112/74   BP Location: Left arm   Patient Position: Sitting  "  Cuff Size: Adult Regular   Pulse: 82   SpO2: 100%   Weight: 139 lb 12.8 oz   Height: 5' 7\"       Body mass index is 21.9 kg/m .     Tino Huertas, EMT- P         30 minutes spent on the date of encounter (excluding time performing procedures) performing chart review, history and exam, documentation and further activities as noted above with an additional 2 mintues for anoscopy.     COLLINS Hernandez, NP-C  Colon and Rectal Surgery   [facility]    This note was created using speech recognition software and may contain unintended word substitutions.    "

## 2023-01-17 NOTE — PATIENT INSTRUCTIONS
Start a daily fiber supplement such as Citrucel powder. Start with once a day and slowly increase up to two to three times a day, if needed, over the next 4-6 weeks  Colonoscopy  Return to clinic if hemorrhoids become symptomatic again and could consider hemorrhoid banding

## 2023-04-06 NOTE — LETTER
"2023       RE: Jong Zepeda  [address]  [facility] [address]     Dear Colleague,    Thank you for referring your patient, Jong Zepeda, to the [facility] COLON AND [facility]AL [facility]GERY [facility]INIC [place] at [facility]. Please see a copy of my visit note below.    Colon and Rectal Surgery Consult Clinic Note    Date: 2023     Referring provider:  Milli Ortega PA-C  [address] [facility] [address]  [place] [address]     RE: Jong Zepeda  : 1995  ANNALISE: 2023    Jong Zepeda is a very pleasant 27 year old male here for hemorrhoids.    HPI:  Has had recurring hemorrhodis for the past 8 years. Gets spotting, some pain, bloating, and abdominal cramping. Some sharp abdominal pain. With external hemorrhoids no sharp pain. Hemorrhoid symptoms have imporved with bidet use. Has some rectal bleeding every few weeks with wiping only. Has not had any bleeding for about two weeks. He has been working on not straining as much with bowel movements and feels this has helped. Some occasional prolapsing tissue. No anorectal surgeries. Does not smoke. Does have anal intercourse. No family history of colon cancer. Has never had a colonoscopy. Bowel movements are very inconsistent.     Physical Examination: Exam was chaperoned by Tino Huertas, EMT-P   /74 (BP Location: Left arm, Patient Position: Sitting, Cuff Size: Adult Regular)   Pulse 82   Ht 5' 7\"   Wt 139 lb 12.8 oz   SpO2 100%   BMI 21.90 kg/m    General: alert, oriented, in no acute distress, sitting comfortably  HEENT: mucous membranes moist  Perianal external examination:  Perianal skin: Intact with no excoriation or lichenification.  Lesions: No evidence of an external lesion, nodularity, or induration in the perianal region.  Eversion of buttocks: There was not evidence of an anal fissure. Details: N/A.  Skin tags or external hemorrhoids: " None.  Digital rectal examination: Was performed.   Sphincter tone: Good.  Palpable lesions: No.  Prostate: Normal.  Other: None.    Anoscopy: Was performed.   Hemorrhoids: Yes. Grade 2 internal hemorrhoids without active bleeding  Lesions: No    Assessment/Plan: 27 year old male with rectal bleeding and abdominal pain. Small internal hemorrhoids which certainly could be the source of bleeding especially in the setting of intermittent constipation and diarrhea. Recommended a daily fiber supplement. Hemorrhoid symptoms have not been as bad lately so would like to try this first and if they become more symptomatic could consider hemorrhoid banding. Given longstanding bleeding and abdominal pain, recommended a colonoscopy also, which he is agreeable to.    Medical history:  Past Medical History:   Diagnosis Date     Depressive disorder Childhood       Surgical history:  No past surgical history on file.    Problem list:    Patient Active Problem List    Diagnosis Date Noted     Pilonidal abscess 04/26/2022     Priority: Medium     On pre-exposure prophylaxis for HIV 04/26/2022     Priority: Medium       Medications:  Current Outpatient Medications   Medication Sig Dispense Refill     emtricitabine-tenofovir (TRUVADA) 200-300 MG per tablet Take 1 tablet by mouth         Allergies:  Allergies   Allergen Reactions     Seasonal Allergies Cough, Itching and Swelling       Family history:  Family History   Problem Relation Age of Onset     Depression Mother      Anxiety Disorder Mother      Asthma Mother      Depression Father      Anxiety Disorder Father      Mental Illness Father      Substance Abuse Father      Depression Brother      Anxiety Disorder Brother      Mental Illness Brother      Diabetes No family hx of      Obesity No family hx of        Social history:  Social History     Tobacco Use     Smoking status: Never     Passive exposure: Yes     Smokeless tobacco: Never     Tobacco comments:     I smoke tied to  "stress levels   Substance Use Topics     Alcohol use: Yes     Comment: occasional    Marital status: single.    Nursing Notes:   Tino Huertas, EMT  1/17/2023  2:39 PM  Signed  Chief Complaint   Patient presents with     New Patient     Hemorrhoids       Vitals:    01/17/23 1436   BP: 112/74   BP Location: Left arm   Patient Position: Sitting   Cuff Size: Adult Regular   Pulse: 82   SpO2: 100%   Weight: 139 lb 12.8 oz   Height: 5' 7\"       Body mass index is 21.9 kg/m .     Tino Huertas, EMT- P         30 minutes spent on the date of encounter (excluding time performing procedures) performing chart review, history and exam, documentation and further activities as noted above with an additional 2 mintues for anoscopy.     COLLINS Hernandez, NP-C  Colon and Rectal Surgery   [facility]    This note was created using speech recognition software and may contain unintended word substitutions.  " Statement Selected

## 2023-05-24 ENCOUNTER — PATIENT OUTREACH (OUTPATIENT)
Dept: CARE COORDINATION | Facility: CLINIC | Age: 28
End: 2023-05-24
Payer: COMMERCIAL

## 2023-08-11 NOTE — LETTER
"    5/17/2022         RE: Jong Zepeda  [address]  [facility] [address]        Dear Colleague,    Thank you for referring your patient, Jong Zepeda, to the [facility]. Please see a copy of my visit note below.      The patient has been notified of following:     \"This telephone visit will be conducted via a call between you and your physician/provider. We have found that certain health care needs can be provided without the need for a physical exam.  This service lets us provide the care you need with a short phone conversation.  If a prescription is necessary we can send it directly to your pharmacy.  If lab work is needed we can place an order for that and you can then stop by our lab to have the test done at a later time.    Telephone visits are billed at different rates depending on your insurance coverage. During this emergency period, for some insurers they may be billed the same as an in-person visit.  Please reach out to your insurance provider with any questions.    If during the course of the call the physician/provider feels a telephone visit is not appropriate, you will not be charged for this service.\"    Patient has given verbal consent to a Telephone visit? Yes    What phone number would you like to be contacted at? [phone]    Patient would like to receive their AVS by [employer]    Are there any specific questions or needs that you would like addressed at your visit today? Nothing specific    Additional provider notes:     Talk to the patient over the phone.  Patient is doing well.  He has no drainage from the site.  The site has healed up well.  Patient has completed his antibiotics course.  He has no further complaints no fevers no chills.    Plan  -Patient can follow-up with me if there is any acute changes to his signs and symptoms.  Otherwise no further scheduled follow-up needed.    Phone call duration: 5 " minutes          Again, thank you for allowing me to participate in the care of your patient.        Sincerely,        Garry Gan, DO     [No Acute Distress] : no acute distress [Well Nourished] : well nourished [Well Developed] : well developed [Well-Appearing] : well-appearing [Normal Sclera/Conjunctiva] : normal sclera/conjunctiva [PERRL] : pupils equal round and reactive to light [EOMI] : extraocular movements intact [Normal Outer Ear/Nose] : the outer ears and nose were normal in appearance [Normal Oropharynx] : the oropharynx was normal [No JVD] : no jugular venous distention [No Lymphadenopathy] : no lymphadenopathy [Supple] : supple [Thyroid Normal, No Nodules] : the thyroid was normal and there were no nodules present [No Respiratory Distress] : no respiratory distress  [No Accessory Muscle Use] : no accessory muscle use [Clear to Auscultation] : lungs were clear to auscultation bilaterally [Normal Rate] : normal rate  [Regular Rhythm] : with a regular rhythm [Normal S1, S2] : normal S1 and S2 [No Murmur] : no murmur heard [No Carotid Bruits] : no carotid bruits [No Abdominal Bruit] : a ~M bruit was not heard ~T in the abdomen [No Varicosities] : no varicosities [Pedal Pulses Present] : the pedal pulses are present [No Edema] : there was no peripheral edema [No Palpable Aorta] : no palpable aorta [No Extremity Clubbing/Cyanosis] : no extremity clubbing/cyanosis [Non Tender] : non-tender [Soft] : abdomen soft [Non-distended] : non-distended [No Masses] : no abdominal mass palpated [No HSM] : no HSM [Normal Bowel Sounds] : normal bowel sounds [Normal Posterior Cervical Nodes] : no posterior cervical lymphadenopathy [Normal Anterior Cervical Nodes] : no anterior cervical lymphadenopathy [No CVA Tenderness] : no CVA  tenderness [No Spinal Tenderness] : no spinal tenderness [No Joint Swelling] : no joint swelling [Grossly Normal Strength/Tone] : grossly normal strength/tone [No Rash] : no rash [Coordination Grossly Intact] : coordination grossly intact [No Focal Deficits] : no focal deficits [Normal Gait] : normal gait [Deep Tendon Reflexes (DTR)] : deep tendon reflexes were 2+ and symmetric [Normal Affect] : the affect was normal [Normal Insight/Judgement] : insight and judgment were intact [de-identified] : +tremor

## 2023-08-13 ENCOUNTER — HEALTH MAINTENANCE LETTER (OUTPATIENT)
Age: 28
End: 2023-08-13

## 2024-10-06 ENCOUNTER — HEALTH MAINTENANCE LETTER (OUTPATIENT)
Age: 29
End: 2024-10-06